# Patient Record
Sex: MALE | Race: OTHER | Employment: OTHER | URBAN - METROPOLITAN AREA
[De-identification: names, ages, dates, MRNs, and addresses within clinical notes are randomized per-mention and may not be internally consistent; named-entity substitution may affect disease eponyms.]

---

## 2019-05-24 ENCOUNTER — NEW REFERRAL (OUTPATIENT)
Dept: URBAN - METROPOLITAN AREA CLINIC 33 | Facility: CLINIC | Age: 79
End: 2019-05-24

## 2019-05-24 VITALS
HEIGHT: 66 IN | DIASTOLIC BLOOD PRESSURE: 79 MMHG | HEART RATE: 60 BPM | WEIGHT: 179 LBS | BODY MASS INDEX: 28.77 KG/M2 | SYSTOLIC BLOOD PRESSURE: 139 MMHG

## 2019-05-24 DIAGNOSIS — H43.391: ICD-10-CM

## 2019-05-24 DIAGNOSIS — E11.3292: ICD-10-CM

## 2019-05-24 DIAGNOSIS — H35.373: ICD-10-CM

## 2019-05-24 DIAGNOSIS — H40.9: ICD-10-CM

## 2019-05-24 DIAGNOSIS — H43.393: ICD-10-CM

## 2019-05-24 DIAGNOSIS — H43.813: ICD-10-CM

## 2019-05-24 DIAGNOSIS — Z79.4: ICD-10-CM

## 2019-05-24 PROCEDURE — 92235 FLUORESCEIN ANGRPH MLTIFRAME: CPT

## 2019-05-24 PROCEDURE — 92225 OPHTHALMOSCOPY (INITIAL): CPT

## 2019-05-24 PROCEDURE — 92004 COMPRE OPH EXAM NEW PT 1/>: CPT

## 2019-05-24 PROCEDURE — 92134 CPTRZ OPH DX IMG PST SGM RTA: CPT

## 2019-05-24 ASSESSMENT — VISUAL ACUITY
OD_SC: 20/20
OS_SC: 20/20

## 2019-05-24 ASSESSMENT — TONOMETRY
OD_IOP_MMHG: 10
OS_IOP_MMHG: 10

## 2019-06-10 ENCOUNTER — FOLLOW UP (OUTPATIENT)
Dept: URBAN - METROPOLITAN AREA CLINIC 33 | Facility: CLINIC | Age: 79
End: 2019-06-10

## 2019-06-10 DIAGNOSIS — Z79.4: ICD-10-CM

## 2019-06-10 DIAGNOSIS — H43.393: ICD-10-CM

## 2019-06-10 DIAGNOSIS — H40.9: ICD-10-CM

## 2019-06-10 DIAGNOSIS — H43.391: ICD-10-CM

## 2019-06-10 DIAGNOSIS — H35.373: ICD-10-CM

## 2019-06-10 DIAGNOSIS — H43.813: ICD-10-CM

## 2019-06-10 DIAGNOSIS — E11.3292: ICD-10-CM

## 2019-06-10 PROCEDURE — 92014 COMPRE OPH EXAM EST PT 1/>: CPT

## 2019-06-10 PROCEDURE — 92226 OPHTHALMOSCOPY (SUB): CPT

## 2019-06-10 PROCEDURE — 92134 CPTRZ OPH DX IMG PST SGM RTA: CPT

## 2019-06-10 ASSESSMENT — VISUAL ACUITY
OS_SC: 20/20-1
OD_SC: 20/20-2

## 2019-06-10 ASSESSMENT — TONOMETRY
OS_IOP_MMHG: 11
OD_IOP_MMHG: 13

## 2019-06-27 ENCOUNTER — SURGERY/PROCEDURE (OUTPATIENT)
Dept: URBAN - METROPOLITAN AREA SURGERY 10 | Facility: SURGERY | Age: 79
End: 2019-06-27

## 2019-06-27 DIAGNOSIS — H43.391: ICD-10-CM

## 2019-06-27 PROCEDURE — 67039 LASER TREATMENT OF RETINA: CPT

## 2019-06-28 ENCOUNTER — POST-OP CHECK (OUTPATIENT)
Dept: URBAN - METROPOLITAN AREA CLINIC 33 | Facility: CLINIC | Age: 79
End: 2019-06-28

## 2019-06-28 DIAGNOSIS — H43.391: ICD-10-CM

## 2019-06-28 DIAGNOSIS — Z98.890: ICD-10-CM

## 2019-06-28 PROCEDURE — 99024 POSTOP FOLLOW-UP VISIT: CPT

## 2019-06-28 ASSESSMENT — TONOMETRY: OD_IOP_MMHG: 7

## 2019-06-28 ASSESSMENT — VISUAL ACUITY: OD_SC: CF 2FT

## 2019-07-09 ENCOUNTER — POST-OP CHECK (OUTPATIENT)
Dept: URBAN - METROPOLITAN AREA CLINIC 33 | Facility: CLINIC | Age: 79
End: 2019-07-09

## 2019-07-09 DIAGNOSIS — H43.391: ICD-10-CM

## 2019-07-09 DIAGNOSIS — Z98.890: ICD-10-CM

## 2019-07-09 PROCEDURE — 99024 POSTOP FOLLOW-UP VISIT: CPT

## 2019-07-09 ASSESSMENT — TONOMETRY
OS_IOP_MMHG: 10
OD_IOP_MMHG: 11

## 2019-07-09 ASSESSMENT — VISUAL ACUITY
OD_SC: 20/30-2
OS_SC: 20/20-1

## 2019-07-31 ENCOUNTER — POST-OP CHECK (OUTPATIENT)
Dept: URBAN - METROPOLITAN AREA CLINIC 33 | Facility: CLINIC | Age: 79
End: 2019-07-31

## 2019-07-31 DIAGNOSIS — H43.391: ICD-10-CM

## 2019-07-31 DIAGNOSIS — Z98.890: ICD-10-CM

## 2019-07-31 PROCEDURE — 99024 POSTOP FOLLOW-UP VISIT: CPT

## 2019-07-31 ASSESSMENT — TONOMETRY
OS_IOP_MMHG: 10
OD_IOP_MMHG: 12

## 2019-07-31 ASSESSMENT — VISUAL ACUITY
OD_SC: 20/20-1
OS_SC: 20/20

## 2019-11-06 ENCOUNTER — FOLLOW UP (OUTPATIENT)
Dept: URBAN - METROPOLITAN AREA CLINIC 33 | Facility: CLINIC | Age: 79
End: 2019-11-06

## 2019-11-06 DIAGNOSIS — E11.3292: ICD-10-CM

## 2019-11-06 DIAGNOSIS — H43.812: ICD-10-CM

## 2019-11-06 DIAGNOSIS — H43.392: ICD-10-CM

## 2019-11-06 DIAGNOSIS — Z79.4: ICD-10-CM

## 2019-11-06 DIAGNOSIS — H40.9: ICD-10-CM

## 2019-11-06 DIAGNOSIS — H35.373: ICD-10-CM

## 2019-11-06 PROCEDURE — 92250 FUNDUS PHOTOGRAPHY W/I&R: CPT

## 2019-11-06 PROCEDURE — 92012 INTRM OPH EXAM EST PATIENT: CPT

## 2019-11-06 ASSESSMENT — TONOMETRY
OS_IOP_MMHG: 15
OD_IOP_MMHG: 16

## 2019-11-06 ASSESSMENT — VISUAL ACUITY
OS_SC: 20/20-2
OD_SC: 20/20-1

## 2020-08-21 NOTE — PATIENT DISCUSSION
6TH NERVE PALSY OD- DUE TO MICROVASCULAR DISEASE. PER PATIENT HIS BLOOD PRESSURE AND BLOOD SUGAR WAS NORMAL WHEN CHECKED AT PCP RECENTLY. THIS TYPICALLY RESOLVES IN A FEW MONTHS.

## 2020-08-21 NOTE — PATIENT DISCUSSION
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 This condition affects blood flow in small blood vessels, particularly cerebral vessels within the brain.

## 2020-08-21 NOTE — PATIENT DISCUSSION
THIS IS THE MOST PROBABLE CAUSE OF RETINOPATHY. REFER BACK TO PRIMARY CARE ASAP WITH POTENTIAL NEED FOR HEMATOLOGY CONSULT. RETURN FOR FOLLOW-UP WITH ME AS SCHEDULED FOR REPEAT DILATED FUNDUS EXAM..

## 2020-08-21 NOTE — PATIENT DISCUSSION
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  PATIENT DESCRIBES HAVING A DIAGNOSIS OF:

## 2020-10-28 ENCOUNTER — FOLLOW UP (OUTPATIENT)
Dept: URBAN - METROPOLITAN AREA CLINIC 33 | Facility: CLINIC | Age: 80
End: 2020-10-28

## 2020-10-28 VITALS — WEIGHT: 179 LBS | BODY MASS INDEX: 28.77 KG/M2 | HEIGHT: 66 IN

## 2020-10-28 DIAGNOSIS — H43.812: ICD-10-CM

## 2020-10-28 DIAGNOSIS — H35.373: ICD-10-CM

## 2020-10-28 DIAGNOSIS — H43.392: ICD-10-CM

## 2020-10-28 DIAGNOSIS — H40.9: ICD-10-CM

## 2020-10-28 DIAGNOSIS — Z79.4: ICD-10-CM

## 2020-10-28 DIAGNOSIS — E11.3292: ICD-10-CM

## 2020-10-28 PROCEDURE — 92250 FUNDUS PHOTOGRAPHY W/I&R: CPT

## 2020-10-28 PROCEDURE — 92014 COMPRE OPH EXAM EST PT 1/>: CPT

## 2020-10-28 ASSESSMENT — TONOMETRY
OS_IOP_MMHG: 12
OD_IOP_MMHG: 11

## 2020-10-28 ASSESSMENT — VISUAL ACUITY
OD_SC: 20/25-1
OS_SC: 20/25-2

## 2021-11-17 ENCOUNTER — FOLLOW UP (OUTPATIENT)
Dept: URBAN - METROPOLITAN AREA CLINIC 33 | Facility: CLINIC | Age: 81
End: 2021-11-17

## 2021-11-17 VITALS — HEIGHT: 66 IN | BODY MASS INDEX: 30.05 KG/M2 | WEIGHT: 187 LBS

## 2021-11-17 DIAGNOSIS — Z79.4: ICD-10-CM

## 2021-11-17 DIAGNOSIS — H43.392: ICD-10-CM

## 2021-11-17 DIAGNOSIS — H35.373: ICD-10-CM

## 2021-11-17 DIAGNOSIS — H40.9: ICD-10-CM

## 2021-11-17 DIAGNOSIS — H43.812: ICD-10-CM

## 2021-11-17 DIAGNOSIS — H04.123: ICD-10-CM

## 2021-11-17 DIAGNOSIS — E11.3292: ICD-10-CM

## 2021-11-17 PROCEDURE — 92014 COMPRE OPH EXAM EST PT 1/>: CPT

## 2021-11-17 PROCEDURE — 92134 CPTRZ OPH DX IMG PST SGM RTA: CPT

## 2021-11-17 ASSESSMENT — VISUAL ACUITY
OS_SC: 20/25
OD_SC: 20/20

## 2021-11-17 ASSESSMENT — TONOMETRY
OS_IOP_MMHG: 6
OD_IOP_MMHG: 7

## 2022-11-28 ENCOUNTER — FOLLOW UP (OUTPATIENT)
Dept: URBAN - METROPOLITAN AREA CLINIC 33 | Facility: CLINIC | Age: 82
End: 2022-11-28

## 2022-11-28 VITALS — HEIGHT: 66 IN | BODY MASS INDEX: 30.05 KG/M2 | WEIGHT: 187 LBS

## 2022-11-28 DIAGNOSIS — H02.833: ICD-10-CM

## 2022-11-28 DIAGNOSIS — E11.3292: ICD-10-CM

## 2022-11-28 DIAGNOSIS — H40.9: ICD-10-CM

## 2022-11-28 DIAGNOSIS — H43.391: ICD-10-CM

## 2022-11-28 DIAGNOSIS — Z79.4: ICD-10-CM

## 2022-11-28 DIAGNOSIS — H02.836: ICD-10-CM

## 2022-11-28 DIAGNOSIS — H43.812: ICD-10-CM

## 2022-11-28 DIAGNOSIS — H35.373: ICD-10-CM

## 2022-11-28 DIAGNOSIS — H04.123: ICD-10-CM

## 2022-11-28 DIAGNOSIS — H43.392: ICD-10-CM

## 2022-11-28 PROCEDURE — 92250 FUNDUS PHOTOGRAPHY W/I&R: CPT

## 2022-11-28 PROCEDURE — 92014 COMPRE OPH EXAM EST PT 1/>: CPT

## 2022-11-28 PROCEDURE — 92134 CPTRZ OPH DX IMG PST SGM RTA: CPT

## 2022-11-28 ASSESSMENT — VISUAL ACUITY
OS_SC: 20/20-2
OD_SC: 20/20-1

## 2022-11-28 ASSESSMENT — TONOMETRY
OS_IOP_MMHG: 10
OD_IOP_MMHG: 9